# Patient Record
Sex: FEMALE | Race: WHITE
[De-identification: names, ages, dates, MRNs, and addresses within clinical notes are randomized per-mention and may not be internally consistent; named-entity substitution may affect disease eponyms.]

---

## 2019-10-17 ENCOUNTER — HOSPITAL ENCOUNTER (INPATIENT)
Dept: HOSPITAL 26 - MED | Age: 84
LOS: 3 days | Discharge: HOME | DRG: 371 | End: 2019-10-20
Attending: GENERAL PRACTICE | Admitting: GENERAL PRACTICE
Payer: COMMERCIAL

## 2019-10-17 VITALS — SYSTOLIC BLOOD PRESSURE: 124 MMHG | DIASTOLIC BLOOD PRESSURE: 75 MMHG

## 2019-10-17 VITALS — WEIGHT: 134 LBS | HEIGHT: 62 IN | BODY MASS INDEX: 24.66 KG/M2

## 2019-10-17 VITALS — SYSTOLIC BLOOD PRESSURE: 140 MMHG | DIASTOLIC BLOOD PRESSURE: 55 MMHG

## 2019-10-17 DIAGNOSIS — E86.0: ICD-10-CM

## 2019-10-17 DIAGNOSIS — E78.5: ICD-10-CM

## 2019-10-17 DIAGNOSIS — A04.72: Primary | ICD-10-CM

## 2019-10-17 DIAGNOSIS — Z83.3: ICD-10-CM

## 2019-10-17 DIAGNOSIS — R31.9: ICD-10-CM

## 2019-10-17 DIAGNOSIS — E43: ICD-10-CM

## 2019-10-17 DIAGNOSIS — Z79.82: ICD-10-CM

## 2019-10-17 DIAGNOSIS — K57.90: ICD-10-CM

## 2019-10-17 DIAGNOSIS — I25.10: ICD-10-CM

## 2019-10-17 DIAGNOSIS — E83.39: ICD-10-CM

## 2019-10-17 DIAGNOSIS — I10: ICD-10-CM

## 2019-10-17 DIAGNOSIS — J45.909: ICD-10-CM

## 2019-10-17 DIAGNOSIS — Z82.3: ICD-10-CM

## 2019-10-17 DIAGNOSIS — Z82.61: ICD-10-CM

## 2019-10-17 DIAGNOSIS — Z88.5: ICD-10-CM

## 2019-10-17 DIAGNOSIS — I48.91: ICD-10-CM

## 2019-10-17 DIAGNOSIS — Z82.49: ICD-10-CM

## 2019-10-17 DIAGNOSIS — Z79.899: ICD-10-CM

## 2019-10-17 DIAGNOSIS — A08.4: ICD-10-CM

## 2019-10-17 DIAGNOSIS — E83.42: ICD-10-CM

## 2019-10-17 LAB
ALBUMIN FLD-MCNC: 3.2 G/DL (ref 3.4–5)
AMYLASE SERPL-CCNC: 60 U/L (ref 25–115)
ANION GAP SERPL CALCULATED.3IONS-SCNC: 16 MMOL/L (ref 8–16)
APPEARANCE UR: CLEAR
AST SERPL-CCNC: 25 U/L (ref 15–37)
BASOPHILS # BLD AUTO: 0 K/UL (ref 0–0.22)
BASOPHILS NFR BLD AUTO: 0.2 % (ref 0–2)
BILIRUB SERPL-MCNC: 1 MG/DL (ref 0–1)
BILIRUB UR QL STRIP: NEGATIVE
BUN SERPL-MCNC: 19 MG/DL (ref 7–18)
CHLORIDE SERPL-SCNC: 108 MMOL/L (ref 98–107)
CHOLEST/HDLC SERPL: 2.4 {RATIO} (ref 1–4.5)
CO2 SERPL-SCNC: 24.6 MMOL/L (ref 21–32)
COLOR UR: YELLOW
CREAT SERPL-MCNC: 0.7 MG/DL (ref 0.6–1.3)
EOSINOPHIL # BLD AUTO: 0.2 K/UL (ref 0–0.4)
EOSINOPHIL NFR BLD AUTO: 1.8 % (ref 0–4)
ERYTHROCYTE [DISTWIDTH] IN BLOOD BY AUTOMATED COUNT: 13.8 % (ref 11.6–13.7)
GFR SERPL CREATININE-BSD FRML MDRD: (no result) ML/MIN (ref 90–?)
GLUCOSE SERPL-MCNC: 102 MG/DL (ref 74–106)
GLUCOSE UR STRIP-MCNC: NEGATIVE MG/DL
HCT VFR BLD AUTO: 42 % (ref 36–48)
HDLC SERPL-MCNC: 59 MG/DL (ref 40–60)
HGB BLD-MCNC: 13.9 G/DL (ref 12–16)
HGB UR QL STRIP: (no result)
LDLC SERPL CALC-MCNC: 70 MG/DL (ref 60–100)
LEUKOCYTE ESTERASE UR QL STRIP: NEGATIVE
LIPASE SERPL-CCNC: 108 U/L (ref 73–393)
LYMPHOCYTES # BLD AUTO: 0.3 K/UL (ref 2.5–16.5)
LYMPHOCYTES NFR BLD AUTO: 2.6 % (ref 20.5–51.1)
MAGNESIUM SERPL-MCNC: 1.6 MG/DL (ref 1.8–2.4)
MCH RBC QN AUTO: 31 PG (ref 27–31)
MCHC RBC AUTO-ENTMCNC: 33 G/DL (ref 33–37)
MCV RBC AUTO: 93.2 FL (ref 80–94)
MONOCYTES # BLD AUTO: 0.3 K/UL (ref 0.8–1)
MONOCYTES NFR BLD AUTO: 3.3 % (ref 1.7–9.3)
NEUTROPHILS # BLD AUTO: 9 K/UL (ref 1.8–7.7)
NEUTROPHILS NFR BLD AUTO: 92.1 % (ref 42.2–75.2)
NITRITE UR QL STRIP: NEGATIVE
PH UR STRIP: 6 [PH] (ref 5–9)
PHOSPHATE SERPL-MCNC: 2.3 MG/DL (ref 2.5–4.9)
PLATELET # BLD AUTO: 210 K/UL (ref 140–450)
POTASSIUM SERPL-SCNC: 3.6 MMOL/L (ref 3.5–5.1)
PROTHROMBIN TIME: 10.6 SECS (ref 10.8–13.4)
RBC # BLD AUTO: 4.51 MIL/UL (ref 4.2–5.4)
RBC #/AREA URNS HPF: (no result) /HPF (ref 0–5)
SODIUM SERPL-SCNC: 145 MMOL/L (ref 136–145)
T4 FREE SERPL-MCNC: 1.28 NG/DL (ref 0.76–1.46)
TRIGL SERPL-MCNC: 52 MG/DL (ref 30–150)
TSH SERPL DL<=0.05 MIU/L-ACNC: 2.12 UIU/ML (ref 0.34–3.74)
WBC # BLD AUTO: 9.8 K/UL (ref 4.8–10.8)
WBC,URINE: (no result) /HPF (ref 0–5)

## 2019-10-17 RX ADMIN — LISINOPRIL SCH MG: 10 TABLET ORAL at 22:26

## 2019-10-17 RX ADMIN — DILTIAZEM HYDROCHLORIDE SCH MG: 30 TABLET, FILM COATED ORAL at 22:26

## 2019-10-17 NOTE — NUR
PT ADMITTED TO [facility] . TRANSFERRED PT VIA LOAN LEDESMA. REPORT GIVEN TO 
YADIRA NICOLE. PT CARE TRANSFERRED TO RECEIVING RN.

## 2019-10-17 NOTE — NUR
MADE ROUNDS , NO DISTRES NOTED AT THIS TIME , NO N/V AT THIS TIME , DENIES PAIN AT THIS 
TIME, WILL CONT. TO MONITOR.

## 2019-10-17 NOTE — NUR
PATIENT PRESENTS TO ED WITH BILATERAL LOWER QUADRANTS PAIN DENIES,  SKIN IS 
PINK/WARM/DRY; AAOX4 WITH EVEN AND STEADY GAIT; LUNGS CLEAR BL; HR EVEN AND 
REGULAR; PT DENIES ANY FEVER, CP, SOB, OR COUGH AT THIS TIME; PATIENT STATES 
PAIN OF 6/10 AT THIS TIME; VSS; PATIENT POSITIONED FOR COMFORT; HOB ELEVATED; 
BEDRAILS UP X2; BED DOWN. ER MD MADE AWARE OF PT STATUS.

## 2019-10-17 NOTE — NUR
RECIEVED PT FROM ER / BALTAZAR ,  AAOX4 , NID , AMBULATES  TO BED , AMBULATE WITH  THE USE OF 
CANE  ACCORDING TO HER HE HAS FOOT DROP . C/O OF ABDL. PAIN 1/10 AS SHE RATED THE PAIN - 
JUST MEDICATED WITH TORADOL AT ER. NPO EXCEPT MEDS INSTRUCT TO PT . MRSA SPECIMEN COLLECTED 
AND SENT TO LAB . ADMISSION ASSESSMENT DONE , FALL RISK - PUT ON SAFETY / FALL PRECAUTION 
PROTOCOL - BED ALARM ON , INSTRUCT THE USE OF CALL LIGHT - CALL LIGHT WITHIN REACH. PLAN OF 
CARE DISCUSSED AND VERBALIZE UNDERSTANDING - FOR STOOL COLLECTION FOR C DIFF. WILL CONT TO 
MONITOR . WITH HX OF MRSA NARES.

-------------------------------------------------------------------------------

Addendum: 10/18/19 at 0314 by Eveline Hogan RN

-------------------------------------------------------------------------------

IV SITE INTACT AND PATENT .JOSÉ

-------------------------------------------------------------------------------

Addendum: 10/18/19 at 0317 by Eveline Hogan RN

-------------------------------------------------------------------------------

THE NURSE'S ENTRY ( HX OF STROKE ) ON ADMISSION HEALTH ASSESSMENT IS WRONG ENTRY. PT STATED 
IN THIER FAMILY THERE FAMILIAL MEDICAL HX OF STROKE  BUT SHE HAS  NO HX OF IT. TO SUM UP PT. 
HAD NO HX OF STROKE- TIKALR

## 2019-10-18 VITALS — DIASTOLIC BLOOD PRESSURE: 72 MMHG | SYSTOLIC BLOOD PRESSURE: 140 MMHG

## 2019-10-18 VITALS — DIASTOLIC BLOOD PRESSURE: 60 MMHG | SYSTOLIC BLOOD PRESSURE: 136 MMHG

## 2019-10-18 VITALS — SYSTOLIC BLOOD PRESSURE: 110 MMHG | DIASTOLIC BLOOD PRESSURE: 60 MMHG

## 2019-10-18 VITALS — DIASTOLIC BLOOD PRESSURE: 59 MMHG | SYSTOLIC BLOOD PRESSURE: 120 MMHG

## 2019-10-18 VITALS — DIASTOLIC BLOOD PRESSURE: 60 MMHG | SYSTOLIC BLOOD PRESSURE: 110 MMHG

## 2019-10-18 VITALS — DIASTOLIC BLOOD PRESSURE: 50 MMHG | SYSTOLIC BLOOD PRESSURE: 116 MMHG

## 2019-10-18 LAB
ANION GAP SERPL CALCULATED.3IONS-SCNC: 13.4 MMOL/L (ref 8–16)
BASOPHILS # BLD AUTO: 0 K/UL (ref 0–0.22)
BASOPHILS NFR BLD AUTO: 0.3 % (ref 0–2)
BUN SERPL-MCNC: 17 MG/DL (ref 7–18)
CHLORIDE SERPL-SCNC: 112 MMOL/L (ref 98–107)
CO2 SERPL-SCNC: 23.3 MMOL/L (ref 21–32)
CREAT SERPL-MCNC: 0.7 MG/DL (ref 0.6–1.3)
EOSINOPHIL # BLD AUTO: 0.1 K/UL (ref 0–0.4)
EOSINOPHIL NFR BLD AUTO: 2.9 % (ref 0–4)
ERYTHROCYTE [DISTWIDTH] IN BLOOD BY AUTOMATED COUNT: 13.9 % (ref 11.6–13.7)
GFR SERPL CREATININE-BSD FRML MDRD: (no result) ML/MIN (ref 90–?)
GLUCOSE SERPL-MCNC: 84 MG/DL (ref 74–106)
HCT VFR BLD AUTO: 37.7 % (ref 36–48)
HGB BLD-MCNC: 12.7 G/DL (ref 12–16)
LYMPHOCYTES # BLD AUTO: 0.5 K/UL (ref 2.5–16.5)
LYMPHOCYTES NFR BLD AUTO: 9.1 % (ref 20.5–51.1)
MCH RBC QN AUTO: 31 PG (ref 27–31)
MCHC RBC AUTO-ENTMCNC: 34 G/DL (ref 33–37)
MCV RBC AUTO: 92.6 FL (ref 80–94)
MONOCYTES # BLD AUTO: 0.3 K/UL (ref 0.8–1)
MONOCYTES NFR BLD AUTO: 6.7 % (ref 1.7–9.3)
NEUTROPHILS # BLD AUTO: 4.1 K/UL (ref 1.8–7.7)
NEUTROPHILS NFR BLD AUTO: 81 % (ref 42.2–75.2)
PLATELET # BLD AUTO: 187 K/UL (ref 140–450)
POTASSIUM SERPL-SCNC: 3.7 MMOL/L (ref 3.5–5.1)
RBC # BLD AUTO: 4.07 MIL/UL (ref 4.2–5.4)
SODIUM SERPL-SCNC: 145 MMOL/L (ref 136–145)
WBC # BLD AUTO: 5.1 K/UL (ref 4.8–10.8)

## 2019-10-18 RX ADMIN — Medication SCH MG: at 09:43

## 2019-10-18 RX ADMIN — DEXTROSE SCH MG: 50 INJECTION, SOLUTION INTRAVENOUS at 23:58

## 2019-10-18 RX ADMIN — DILTIAZEM HYDROCHLORIDE SCH MG: 30 TABLET, FILM COATED ORAL at 21:00

## 2019-10-18 RX ADMIN — MONTELUKAST SCH MG: 10 TABLET, FILM COATED ORAL at 09:42

## 2019-10-18 RX ADMIN — LISINOPRIL SCH MG: 10 TABLET ORAL at 21:00

## 2019-10-18 RX ADMIN — SODIUM CHLORIDE SCH MLS/HR: 9 INJECTION, SOLUTION INTRAVENOUS at 18:38

## 2019-10-18 RX ADMIN — ATORVASTATIN CALCIUM SCH MG: 20 TABLET, FILM COATED ORAL at 09:42

## 2019-10-18 RX ADMIN — LISINOPRIL SCH MG: 20 TABLET ORAL at 09:41

## 2019-10-18 RX ADMIN — DILTIAZEM HYDROCHLORIDE SCH MG: 30 TABLET, FILM COATED ORAL at 09:43

## 2019-10-18 NOTE — NUR
PATIENT HAS BEEN SCREENED AND CATEGORIZED AS MODERATE NUTRITION RISK. PATIENT WILL BE SEEN 
WITHIN 3-5 DAYS OF ADMISSION.



10/20/19  10/22/19



MARLEEN STUBBS RD

-------------------------------------------------------------------------------

Addendum: 10/18/19 at 1015 by Marleen Stubbs RD

-------------------------------------------------------------------------------

DISREGARD NOTE ABOVE. FNS CONSULT HAS BEEN RECEIVED FOR MALNUTRITION AND CARDIAC DIET 
EDUCATION. PATIENT HAS BEEN RE-SCREENED AS HIGH RISK AND WILL BE SEEN WITHIN 1-2 DAYS OF 
RECEIVING THE FNS CONSULT.



10/18/19- 10/19/19



MARLEEN STUBBS RD

## 2019-10-18 NOTE — NUR
ADMINISTERED SCHEDULED MEDICATIONS. PATIENT TOLERATED WELL. NO OTHER NEEDS AT THIS TIME. 
WILL CONTINUE TO MONITOR.

## 2019-10-18 NOTE — NUR
ADDRESSED PATIENTS CONCERNS. INSTRUCTED PATIENT TO USE CALL LIGHT IF SHE FELT NAUSEOUS. NO 
OTHER NEEDS AT THIS TIME, WILL CONTINUE TO MONITOR.

## 2019-10-18 NOTE — NUR
ADMINISTERED SCHEDULED MEDICATION. PATIENT TOLERATED WELL. PATIENT DENIES ANY PAIN. NO OTHER 
NEEDS AT THIS TIME.

## 2019-10-18 NOTE — NUR
REMOVED IV, PATIENT C/O OF PAIN AND LEAKAGE. INSERTED NEW ONE USING ASEPTIC TECHNIQUE, LEFT 
FA22G. PATIENT DENIES ANY PAIN. NO OTHER NEEDS AT THIS TIME.

## 2019-10-18 NOTE — NUR
RECIEVED PT AAOX4 , NID , IV SITE INTACT AND PATENT , WITH EPISODE OF LOOSE BM - WILL CONT. 
TO MONITOR , ON FULL LIQ. DIET - REINSTRUCTED , ON SAFETY / PRECAUTION PROTOCOL - BED ALARM 
ON , CALL LIGHT WITHIN REACH , PALAN OF CARE DISCUSSED AND VERBALIZE UNDERSTANDING , WILL 
CONT. TO MONITOR.

## 2019-10-18 NOTE — NUR
10/18/19 RD INITIAL ASSESSMENT COMPLETED



PLEASE REFER TO NUTRITION ASSESSMENT UNDER CARE ACTIVITY FOR ESTIMATED NUTRITIONAL NEEDS. 



1. CONTINUE CLEAR LIQUID DIET AS TOLERATED 

2. RECOMMEND ENSURE CLEAR TID AS TOLERATED

3. RD PROVIDED NUTRITION EDUCATION ON DIVERTICULITIS. PT ACCEPTED

4. WHEN MEDICALLY APPROPRIATE ADVANCE PT TO LOW FIBER DIET

5. RD TO FOLLOW-UP 2-3 DAYS, HIGH RISK 



HERNANDEZ STUBBS, RD

## 2019-10-18 NOTE — NUR
RECEIVED ENDORSEMENT FROM CHARGE NURSE. PATIENT IS AAOX4, ENGLISH SPEAKING. RESPIRATIONS ARE 
EVEN AND UNLABORED ON ROOM AIR. PATIENT DENIES ANY PAIN AT THIS TIME. LEFT AC 20G IV INTACT, 
PATENT, AND INFUSING IVF. PLAN OF CARE WAS REVIEWED WITH PATIENT, PATIENT VERBALIZED 
UNDERSTANDING. SAFETY MEASURES IN PLACE, CALL LIGHT WITHIN REACH.

## 2019-10-18 NOTE — NUR
MADE ROUNDS , NO ANY SIGNS OF  DISTRESS NOTED AT THIS TIME., STILL FOR STOOL COLLECTION 
.CALL LIGHT WITHIN REACH.

## 2019-10-18 NOTE — NUR
*S.T. Bedside swallow eval*

See report. Pt presents w/ adequate oropharyngeal swallow function for the current diet she 
is on, which is clear liquids. No overt s/s aspiration observed w/ P.O. trial water via cup 
sip. Pt refused all other P.O. trials due to feeling bloated, as she did yesterday, which 
led to vomiting, diarrhea and her admission into hospital. Pt was tearful and expressed her 
concerns to this clinician and RN Nichelle, who was came to room at clinician's request. 
Pt's bedside RN Shanika was with another patient at the time. Oropharyngeal dysphagia is not 
suspected. Pt denied any history of s/s aspiration. 

Recommend:

1) Continue P.O. diet as tolerated. Defer to medicine team on diet textures and dietary 
restrictions. 

2) DC to nsg care at this time. Pt does not demonstrate a clinical dysphagia and therefore 
no further tx indicated. 

Endorsed to nsg. 



Time [id number]

## 2019-10-19 VITALS — DIASTOLIC BLOOD PRESSURE: 70 MMHG | SYSTOLIC BLOOD PRESSURE: 130 MMHG

## 2019-10-19 VITALS — DIASTOLIC BLOOD PRESSURE: 63 MMHG | SYSTOLIC BLOOD PRESSURE: 124 MMHG

## 2019-10-19 VITALS — DIASTOLIC BLOOD PRESSURE: 70 MMHG | SYSTOLIC BLOOD PRESSURE: 138 MMHG

## 2019-10-19 VITALS — DIASTOLIC BLOOD PRESSURE: 72 MMHG | SYSTOLIC BLOOD PRESSURE: 133 MMHG

## 2019-10-19 VITALS — DIASTOLIC BLOOD PRESSURE: 69 MMHG | SYSTOLIC BLOOD PRESSURE: 150 MMHG

## 2019-10-19 VITALS — SYSTOLIC BLOOD PRESSURE: 143 MMHG | DIASTOLIC BLOOD PRESSURE: 66 MMHG

## 2019-10-19 LAB
ANION GAP SERPL CALCULATED.3IONS-SCNC: 12.6 MMOL/L (ref 8–16)
BASOPHILS # BLD AUTO: 0 K/UL (ref 0–0.22)
BASOPHILS NFR BLD AUTO: 0.5 % (ref 0–2)
BUN SERPL-MCNC: 9 MG/DL (ref 7–18)
CHLORIDE SERPL-SCNC: 110 MMOL/L (ref 98–107)
CO2 SERPL-SCNC: 22.3 MMOL/L (ref 21–32)
CREAT SERPL-MCNC: 0.8 MG/DL (ref 0.6–1.3)
EOSINOPHIL # BLD AUTO: 0.3 K/UL (ref 0–0.4)
EOSINOPHIL NFR BLD AUTO: 7.4 % (ref 0–4)
ERYTHROCYTE [DISTWIDTH] IN BLOOD BY AUTOMATED COUNT: 14.2 % (ref 11.6–13.7)
GFR SERPL CREATININE-BSD FRML MDRD: (no result) ML/MIN (ref 90–?)
GLUCOSE SERPL-MCNC: 79 MG/DL (ref 74–106)
HCT VFR BLD AUTO: 39.1 % (ref 36–48)
HGB BLD-MCNC: 12.9 G/DL (ref 12–16)
LYMPHOCYTES # BLD AUTO: 1.3 K/UL (ref 2.5–16.5)
LYMPHOCYTES NFR BLD AUTO: 29.3 % (ref 20.5–51.1)
MAGNESIUM SERPL-MCNC: 1.8 MG/DL (ref 1.8–2.4)
MCH RBC QN AUTO: 31 PG (ref 27–31)
MCHC RBC AUTO-ENTMCNC: 33 G/DL (ref 33–37)
MCV RBC AUTO: 94.1 FL (ref 80–94)
MONOCYTES # BLD AUTO: 0.4 K/UL (ref 0.8–1)
MONOCYTES NFR BLD AUTO: 9.8 % (ref 1.7–9.3)
NEUTROPHILS # BLD AUTO: 2.3 K/UL (ref 1.8–7.7)
NEUTROPHILS NFR BLD AUTO: 53 % (ref 42.2–75.2)
PHOSPHATE SERPL-MCNC: 2.5 MG/DL (ref 2.5–4.9)
PLATELET # BLD AUTO: 174 K/UL (ref 140–450)
POTASSIUM SERPL-SCNC: 3.9 MMOL/L (ref 3.5–5.1)
RBC # BLD AUTO: 4.16 MIL/UL (ref 4.2–5.4)
SODIUM SERPL-SCNC: 141 MMOL/L (ref 136–145)
WBC # BLD AUTO: 4.4 K/UL (ref 4.8–10.8)

## 2019-10-19 RX ADMIN — LISINOPRIL SCH MG: 20 TABLET ORAL at 09:04

## 2019-10-19 RX ADMIN — LISINOPRIL SCH MG: 10 TABLET ORAL at 21:05

## 2019-10-19 RX ADMIN — DILTIAZEM HYDROCHLORIDE SCH MG: 30 TABLET, FILM COATED ORAL at 09:04

## 2019-10-19 RX ADMIN — DEXTROSE SCH MG: 50 INJECTION, SOLUTION INTRAVENOUS at 11:54

## 2019-10-19 RX ADMIN — Medication SCH MG: at 09:04

## 2019-10-19 RX ADMIN — DEXTROSE SCH MG: 50 INJECTION, SOLUTION INTRAVENOUS at 17:46

## 2019-10-19 RX ADMIN — DEXTROSE SCH MG: 50 INJECTION, SOLUTION INTRAVENOUS at 06:00

## 2019-10-19 RX ADMIN — DILTIAZEM HYDROCHLORIDE SCH MG: 30 TABLET, FILM COATED ORAL at 21:05

## 2019-10-19 RX ADMIN — ATORVASTATIN CALCIUM SCH MG: 20 TABLET, FILM COATED ORAL at 09:05

## 2019-10-19 RX ADMIN — SODIUM CHLORIDE SCH MLS/HR: 9 INJECTION, SOLUTION INTRAVENOUS at 07:13

## 2019-10-19 RX ADMIN — DEXTROSE SCH MG: 50 INJECTION, SOLUTION INTRAVENOUS at 23:47

## 2019-10-19 RX ADMIN — MONTELUKAST SCH MG: 10 TABLET, FILM COATED ORAL at 09:04

## 2019-10-19 NOTE — NUR
Received bedside report from pm nurse Eveline. Pt resting in bed, awake, watching TV, no 
signs of distress, no c/o discomfort. Left forearm IV intact with ongoing NS@ 80ml/h. Pt's 
personal cane & call light within reach. Encouraged pt to call for assistance with 
transfers/ambulation for safety, verbalized understanding.

## 2019-10-19 NOTE — NUR
Pt sitting up in bed, eating dinner, no c/o discomfort, no signs of distress. Pt denies any 
GI discomfort. Left forearm IV intact & asymptomatic. Call light within reach.

## 2019-10-19 NOTE — NUR
PATIENT IN BED WATCHING TV RESPIRATION EVEN UNLABORED ON ROOM AIR. NO DISTRESS NOTED. WILL 
CONTINUE TO MONITOR.

## 2019-10-19 NOTE — NUR
RECEIVED BEDSIDE REPORT FROM DAY SHIFT NURSE. PATIENT IS AWAKE, ALERT, AND COOPERATIVE. 
RESPIRATION EVEN UNLABORED ON ROOM AIR. NO DISTRESS NOTED. SKIN IS WARM AND DRY. IV PATENT 
AND INTACT. DENIES PAIN. FAMILY AT BEDSIDE. PLAN OF CARE WAS DISCUSSED. ALL SAFETY MEASURES 
IN PLACE. CONTACT PRECAUTION MAINTAINED. BED IS AT LOW POSITION. CALL LIGHT WITHIN REACH AND 
VERBALIZES ITS USE. WILL CONTINUE TO MONITOR.

## 2019-10-19 NOTE — NUR
Pt resting in bed, watching TV, no signs of distress, no c/o pain. Per pt, no BM since last 
night, no GI discomfort. Left forearm IV intact with ongoing NS @ 80ml/hr. Call light within 
reach.

## 2019-10-20 VITALS — DIASTOLIC BLOOD PRESSURE: 70 MMHG | SYSTOLIC BLOOD PRESSURE: 145 MMHG

## 2019-10-20 VITALS — SYSTOLIC BLOOD PRESSURE: 142 MMHG | DIASTOLIC BLOOD PRESSURE: 75 MMHG

## 2019-10-20 VITALS — SYSTOLIC BLOOD PRESSURE: 150 MMHG | DIASTOLIC BLOOD PRESSURE: 66 MMHG

## 2019-10-20 LAB
ANION GAP SERPL CALCULATED.3IONS-SCNC: 13.1 MMOL/L (ref 8–16)
BASOPHILS # BLD AUTO: 0 K/UL (ref 0–0.22)
BASOPHILS NFR BLD AUTO: 0.5 % (ref 0–2)
BUN SERPL-MCNC: 8 MG/DL (ref 7–18)
CHLORIDE SERPL-SCNC: 110 MMOL/L (ref 98–107)
CO2 SERPL-SCNC: 24.7 MMOL/L (ref 21–32)
CREAT SERPL-MCNC: 0.7 MG/DL (ref 0.6–1.3)
EOSINOPHIL # BLD AUTO: 0.4 K/UL (ref 0–0.4)
EOSINOPHIL NFR BLD AUTO: 8.5 % (ref 0–4)
ERYTHROCYTE [DISTWIDTH] IN BLOOD BY AUTOMATED COUNT: 13.8 % (ref 11.6–13.7)
GFR SERPL CREATININE-BSD FRML MDRD: (no result) ML/MIN (ref 90–?)
GLUCOSE SERPL-MCNC: 81 MG/DL (ref 74–106)
HCT VFR BLD AUTO: 39.1 % (ref 36–48)
HGB BLD-MCNC: 13 G/DL (ref 12–16)
LYMPHOCYTES # BLD AUTO: 1.4 K/UL (ref 2.5–16.5)
LYMPHOCYTES NFR BLD AUTO: 30.3 % (ref 20.5–51.1)
MAGNESIUM SERPL-MCNC: 1.7 MG/DL (ref 1.8–2.4)
MCH RBC QN AUTO: 31 PG (ref 27–31)
MCHC RBC AUTO-ENTMCNC: 33 G/DL (ref 33–37)
MCV RBC AUTO: 92.6 FL (ref 80–94)
MONOCYTES # BLD AUTO: 0.5 K/UL (ref 0.8–1)
MONOCYTES NFR BLD AUTO: 10.3 % (ref 1.7–9.3)
NEUTROPHILS # BLD AUTO: 2.3 K/UL (ref 1.8–7.7)
NEUTROPHILS NFR BLD AUTO: 50.4 % (ref 42.2–75.2)
PHOSPHATE SERPL-MCNC: 3.2 MG/DL (ref 2.5–4.9)
PLATELET # BLD AUTO: 166 K/UL (ref 140–450)
POTASSIUM SERPL-SCNC: 3.8 MMOL/L (ref 3.5–5.1)
RBC # BLD AUTO: 4.22 MIL/UL (ref 4.2–5.4)
SODIUM SERPL-SCNC: 144 MMOL/L (ref 136–145)
WBC # BLD AUTO: 4.5 K/UL (ref 4.8–10.8)

## 2019-10-20 RX ADMIN — MONTELUKAST SCH MG: 10 TABLET, FILM COATED ORAL at 08:34

## 2019-10-20 RX ADMIN — DEXTROSE SCH MG: 50 INJECTION, SOLUTION INTRAVENOUS at 06:01

## 2019-10-20 RX ADMIN — ATORVASTATIN CALCIUM SCH MG: 20 TABLET, FILM COATED ORAL at 08:33

## 2019-10-20 RX ADMIN — Medication SCH MG: at 08:33

## 2019-10-20 RX ADMIN — LISINOPRIL SCH MG: 20 TABLET ORAL at 08:33

## 2019-10-20 RX ADMIN — DILTIAZEM HYDROCHLORIDE SCH MG: 30 TABLET, FILM COATED ORAL at 08:35

## 2019-10-20 NOTE — NUR
Pt discharge to home. Pt was taken by wheel chair to her car. Pt was accompanied by son and 
grandson. Pt was stable at discharge. no C/O pain. No distress noted. Discharge instruction 
given to patient. All belongings with patient.  Pt to follow up PCP within 5 days. IV line 
removed. Catheter intact.

## 2022-02-26 ENCOUNTER — HOSPITAL ENCOUNTER (EMERGENCY)
Dept: HOSPITAL 26 - MED | Age: 87
Discharge: HOME | End: 2022-02-26
Payer: COMMERCIAL

## 2022-02-26 VITALS — SYSTOLIC BLOOD PRESSURE: 160 MMHG | DIASTOLIC BLOOD PRESSURE: 74 MMHG

## 2022-02-26 VITALS — DIASTOLIC BLOOD PRESSURE: 78 MMHG | SYSTOLIC BLOOD PRESSURE: 128 MMHG

## 2022-02-26 VITALS — WEIGHT: 170 LBS | HEIGHT: 64 IN | BODY MASS INDEX: 29.02 KG/M2

## 2022-02-26 DIAGNOSIS — Z79.899: ICD-10-CM

## 2022-02-26 DIAGNOSIS — M25.775: Primary | ICD-10-CM

## 2022-02-26 DIAGNOSIS — I10: ICD-10-CM

## 2022-02-26 DIAGNOSIS — Z79.82: ICD-10-CM

## 2022-02-26 DIAGNOSIS — J45.909: ICD-10-CM

## 2022-02-26 DIAGNOSIS — Z88.5: ICD-10-CM

## 2022-02-26 PROCEDURE — 99284 EMERGENCY DEPT VISIT MOD MDM: CPT

## 2022-02-26 PROCEDURE — 93971 EXTREMITY STUDY: CPT

## 2022-02-26 NOTE — NUR
Patient discharged with v/s stable. Written and verbal after care instructions 
given on corn and calluses and explained. 

Patient alert, oriented and verbalized understanding of instructions. 
Ambulatory with steady gait. All questions addressed prior to discharge. ID 
band removed. Patient advised to follow up with PMD. Rx of keflex given. 
Opportunity to ask questions provided and answered.

## 2022-02-26 NOTE — NUR
86/F C/O OF RIGHT FOOT CALLUS, 7/10 PAIN. PATIENT STATES THAT IT HURTS TO WALK 
ON FOOT AND IT FEELS LIKE ITS INFECTED. 



PMHX: HYPERTENSION, A-FIB, CHOLESTEROL, ADULT ASTHMA

## 2022-03-12 ENCOUNTER — HOSPITAL ENCOUNTER (EMERGENCY)
Dept: HOSPITAL 26 - MED | Age: 87
Discharge: HOME | End: 2022-03-12
Payer: COMMERCIAL

## 2022-03-12 VITALS — DIASTOLIC BLOOD PRESSURE: 72 MMHG | SYSTOLIC BLOOD PRESSURE: 152 MMHG

## 2022-03-12 VITALS — SYSTOLIC BLOOD PRESSURE: 152 MMHG | DIASTOLIC BLOOD PRESSURE: 72 MMHG

## 2022-03-12 VITALS — HEIGHT: 63 IN | BODY MASS INDEX: 29.23 KG/M2 | WEIGHT: 165 LBS

## 2022-03-12 DIAGNOSIS — Z79.82: ICD-10-CM

## 2022-03-12 DIAGNOSIS — J45.909: ICD-10-CM

## 2022-03-12 DIAGNOSIS — Z85.9: ICD-10-CM

## 2022-03-12 DIAGNOSIS — Z88.5: ICD-10-CM

## 2022-03-12 DIAGNOSIS — M25.512: Primary | ICD-10-CM

## 2022-03-12 DIAGNOSIS — Z79.899: ICD-10-CM

## 2022-03-12 DIAGNOSIS — I10: ICD-10-CM

## 2022-03-12 PROCEDURE — 99284 EMERGENCY DEPT VISIT MOD MDM: CPT

## 2022-03-12 PROCEDURE — 73030 X-RAY EXAM OF SHOULDER: CPT

## 2022-03-12 PROCEDURE — 71045 X-RAY EXAM CHEST 1 VIEW: CPT

## 2022-03-12 NOTE — NUR
Patient discharged with v/s stable. Written and verbal after care instructions 
given and explained. 

Patient alert, oriented and verbalized understanding of instructions. 
Ambulatory with steady gait. All questions addressed prior to discharge. ID 
band removed. IV DISCONTINUED. Patient advised to follow up with PMD. Rx of 
LIDOCAINE given. Patient educated on indication of medication including 
possible reaction and side effects. Opportunity to ask questions provided and 
answered.

## 2022-03-12 NOTE — NUR
85 y/o F BIBA from home c/o L upper back pain since yesterday, reports today 
worsening pain to L shoulder and L side of neck. Patient A&Ox4, ambulatory with 
cane, states pain yesterday was alleviated with repositioning and reports pain 
radiating pain. Pt noted with infection to L foot per EMS. Patient reports 
8/10, dull/constant, radiating to shoulder, L neck, and chest. EMS 12 lead 
A-fib @ HR 88. Pt denies nausea, vomiting, SOB, fall, trauma, dizziness, 
headache. Pt placed into gown and cardiac monitor. Bed locked in lowest 
position, side rails x 1. 20G established to Left FA by EMS. 



PMH: a-fib, HTN, HLD, asthma 

Meds: simacor, keflex, ASA,  montelukast, lisinopril

A: morphine

## 2022-06-26 ENCOUNTER — HOSPITAL ENCOUNTER (EMERGENCY)
Dept: HOSPITAL 26 - MED | Age: 87
Discharge: HOME | End: 2022-06-26
Payer: COMMERCIAL

## 2022-06-26 VITALS — HEIGHT: 62 IN | WEIGHT: 167.25 LBS | BODY MASS INDEX: 30.78 KG/M2

## 2022-06-26 VITALS — DIASTOLIC BLOOD PRESSURE: 89 MMHG | SYSTOLIC BLOOD PRESSURE: 126 MMHG

## 2022-06-26 VITALS — DIASTOLIC BLOOD PRESSURE: 57 MMHG | SYSTOLIC BLOOD PRESSURE: 113 MMHG

## 2022-06-26 DIAGNOSIS — I48.20: ICD-10-CM

## 2022-06-26 DIAGNOSIS — Z79.2: ICD-10-CM

## 2022-06-26 DIAGNOSIS — Z79.82: ICD-10-CM

## 2022-06-26 DIAGNOSIS — Z88.5: ICD-10-CM

## 2022-06-26 DIAGNOSIS — J45.909: ICD-10-CM

## 2022-06-26 DIAGNOSIS — R19.7: ICD-10-CM

## 2022-06-26 DIAGNOSIS — Z20.822: ICD-10-CM

## 2022-06-26 DIAGNOSIS — J01.90: Primary | ICD-10-CM

## 2022-06-26 DIAGNOSIS — I10: ICD-10-CM

## 2022-06-26 DIAGNOSIS — Z79.899: ICD-10-CM

## 2022-06-26 DIAGNOSIS — Z98.890: ICD-10-CM

## 2022-06-26 LAB
ANION GAP SERPL CALCULATED.3IONS-SCNC: 14.7 MMOL/L (ref 8–16)
BASOPHILS # BLD AUTO: 0 K/UL (ref 0–0.22)
BASOPHILS NFR BLD AUTO: 0.3 % (ref 0–2)
BUN SERPL-MCNC: 12 MG/DL (ref 7–18)
CHLORIDE SERPL-SCNC: 109 MMOL/L (ref 98–107)
CO2 SERPL-SCNC: 24.3 MMOL/L (ref 21–32)
CREAT SERPL-MCNC: 0.9 MG/DL (ref 0.6–1.3)
EOSINOPHIL # BLD AUTO: 1.9 K/UL (ref 0–0.4)
EOSINOPHIL NFR BLD AUTO: 22.4 % (ref 0–4)
ERYTHROCYTE [DISTWIDTH] IN BLOOD BY AUTOMATED COUNT: 14.6 % (ref 11.6–13.7)
GFR SERPL CREATININE-BSD FRML MDRD: (no result) ML/MIN (ref 90–?)
GLUCOSE SERPL-MCNC: 109 MG/DL (ref 74–106)
HCT VFR BLD AUTO: 40.2 % (ref 36–48)
HGB BLD-MCNC: 13.7 G/DL (ref 12–16)
LYMPHOCYTES # BLD AUTO: 1.3 K/UL (ref 2.5–16.5)
LYMPHOCYTES NFR BLD AUTO: 14.9 % (ref 20.5–51.1)
MCH RBC QN AUTO: 32 PG (ref 27–31)
MCHC RBC AUTO-ENTMCNC: 34 G/DL (ref 33–37)
MCV RBC AUTO: 92.9 FL (ref 80–94)
MONOCYTES # BLD AUTO: 0.8 K/UL (ref 0.8–1)
MONOCYTES NFR BLD AUTO: 9.3 % (ref 1.7–9.3)
NEUTROPHILS # BLD AUTO: 4.5 K/UL (ref 1.8–7.7)
NEUTROPHILS NFR BLD AUTO: 53.1 % (ref 42.2–75.2)
PLATELET # BLD AUTO: 203 K/UL (ref 140–450)
POTASSIUM SERPL-SCNC: 4 MMOL/L (ref 3.5–5.1)
RBC # BLD AUTO: 4.33 MIL/UL (ref 4.2–5.4)
SODIUM SERPL-SCNC: 144 MMOL/L (ref 136–145)
WBC # BLD AUTO: 8.5 K/UL (ref 4.8–10.8)

## 2022-06-26 PROCEDURE — 99285 EMERGENCY DEPT VISIT HI MDM: CPT

## 2022-06-26 PROCEDURE — 85025 COMPLETE CBC W/AUTO DIFF WBC: CPT

## 2022-06-26 PROCEDURE — 93005 ELECTROCARDIOGRAM TRACING: CPT

## 2022-06-26 PROCEDURE — 71045 X-RAY EXAM CHEST 1 VIEW: CPT

## 2022-06-26 PROCEDURE — 81002 URINALYSIS NONAUTO W/O SCOPE: CPT

## 2022-06-26 PROCEDURE — 96361 HYDRATE IV INFUSION ADD-ON: CPT

## 2022-06-26 PROCEDURE — 96360 HYDRATION IV INFUSION INIT: CPT

## 2022-06-26 PROCEDURE — 83735 ASSAY OF MAGNESIUM: CPT

## 2022-06-26 PROCEDURE — 80048 BASIC METABOLIC PNL TOTAL CA: CPT

## 2022-06-26 PROCEDURE — 83880 ASSAY OF NATRIURETIC PEPTIDE: CPT

## 2022-06-26 PROCEDURE — 84484 ASSAY OF TROPONIN QUANT: CPT

## 2022-06-26 PROCEDURE — 36415 COLL VENOUS BLD VENIPUNCTURE: CPT

## 2022-06-26 PROCEDURE — 87426 SARSCOV CORONAVIRUS AG IA: CPT

## 2022-06-26 PROCEDURE — 87804 INFLUENZA ASSAY W/OPTIC: CPT

## 2022-06-26 NOTE — NUR
Patient discharged with v/s stable. Written and verbal after care instructions 
given and explained. 

Patient alert, oriented and verbalized understanding of instructions. 
Ambulatory to car. All questions addressed prior to discharge. ID band removed. 
Patient advised to follow up with PMD. Rx of ditiazem (sent) given. Patient 
educated on indication of medication including possible reaction and side 
effects. Opportunity to ask questions provided and answered.

## 2022-06-26 NOTE — NUR
88 y/o female, c/o throat pain, cough, chest/nasal congestion, ha for 1 month. 
pt reports new onset of diarrhea this morning. pt also states she tested 
negative for covid this morning with at home test. skin is pink/warm/dry. a&o 
x4 with even and steady gait. lungs clear bl, heart rate even and regular. pt 
denies anyone sick in the household with the same symptoms. pt states pain is 
8/10 at this time. patient positioned for comfort. hob elevated. bed down. ermd 
made aware of pt.



pmh: atrial fibrillation, htn, asthma

allergy: morphine 

med: denies

## 2023-01-23 ENCOUNTER — HOSPITAL ENCOUNTER (INPATIENT)
Dept: HOSPITAL 26 - MED | Age: 88
LOS: 2 days | Discharge: HOME HEALTH | DRG: 299 | End: 2023-01-25
Attending: STUDENT IN AN ORGANIZED HEALTH CARE EDUCATION/TRAINING PROGRAM | Admitting: STUDENT IN AN ORGANIZED HEALTH CARE EDUCATION/TRAINING PROGRAM
Payer: COMMERCIAL

## 2023-01-23 VITALS — WEIGHT: 99.99 LBS | HEIGHT: 62 IN | BODY MASS INDEX: 18.4 KG/M2

## 2023-01-23 VITALS — SYSTOLIC BLOOD PRESSURE: 143 MMHG | DIASTOLIC BLOOD PRESSURE: 107 MMHG

## 2023-01-23 DIAGNOSIS — Z82.3: ICD-10-CM

## 2023-01-23 DIAGNOSIS — I82.432: ICD-10-CM

## 2023-01-23 DIAGNOSIS — E44.1: ICD-10-CM

## 2023-01-23 DIAGNOSIS — N17.0: ICD-10-CM

## 2023-01-23 DIAGNOSIS — J45.909: ICD-10-CM

## 2023-01-23 DIAGNOSIS — E78.5: ICD-10-CM

## 2023-01-23 DIAGNOSIS — Z20.822: ICD-10-CM

## 2023-01-23 DIAGNOSIS — I10: ICD-10-CM

## 2023-01-23 DIAGNOSIS — I45.10: ICD-10-CM

## 2023-01-23 DIAGNOSIS — Z88.5: ICD-10-CM

## 2023-01-23 DIAGNOSIS — Z79.82: ICD-10-CM

## 2023-01-23 DIAGNOSIS — R65.10: ICD-10-CM

## 2023-01-23 DIAGNOSIS — I82.412: Primary | ICD-10-CM

## 2023-01-23 DIAGNOSIS — Z82.49: ICD-10-CM

## 2023-01-23 DIAGNOSIS — Z83.3: ICD-10-CM

## 2023-01-23 DIAGNOSIS — Z79.899: ICD-10-CM

## 2023-01-23 DIAGNOSIS — I26.99: ICD-10-CM

## 2023-01-23 LAB
ALBUMIN FLD-MCNC: 3.3 G/DL (ref 3.4–5)
ANION GAP SERPL CALCULATED.3IONS-SCNC: 13.2 MMOL/L (ref 8–16)
AST SERPL-CCNC: 14 U/L (ref 15–37)
BASOPHILS # BLD AUTO: 0.1 K/UL (ref 0–0.22)
BASOPHILS NFR BLD AUTO: 0.6 % (ref 0–2)
BILIRUB SERPL-MCNC: 0.9 MG/DL (ref 0–1)
BUN SERPL-MCNC: 30 MG/DL (ref 7–18)
CHLORIDE SERPL-SCNC: 108 MMOL/L (ref 98–107)
CO2 SERPL-SCNC: 23.1 MMOL/L (ref 21–32)
CREAT SERPL-MCNC: 1.1 MG/DL (ref 0.6–1.3)
EOSINOPHIL # BLD AUTO: 0.1 K/UL (ref 0–0.4)
EOSINOPHIL NFR BLD AUTO: 1.3 % (ref 0–4)
ERYTHROCYTE [DISTWIDTH] IN BLOOD BY AUTOMATED COUNT: 15.2 % (ref 11.6–13.7)
GFR SERPL CREATININE-BSD FRML MDRD: (no result) ML/MIN (ref 90–?)
GLUCOSE SERPL-MCNC: 102 MG/DL (ref 74–106)
HCT VFR BLD AUTO: 42 % (ref 36–48)
HGB BLD-MCNC: 14 G/DL (ref 12–16)
LYMPHOCYTES # BLD AUTO: 2 K/UL (ref 2.5–16.5)
LYMPHOCYTES NFR BLD AUTO: 24 % (ref 20.5–51.1)
MCH RBC QN AUTO: 31 PG (ref 27–31)
MCHC RBC AUTO-ENTMCNC: 33 G/DL (ref 33–37)
MCV RBC AUTO: 91.5 FL (ref 80–94)
MONOCYTES # BLD AUTO: 0.8 K/UL (ref 0.8–1)
MONOCYTES NFR BLD AUTO: 9.9 % (ref 1.7–9.3)
NEUTROPHILS # BLD AUTO: 5.5 K/UL (ref 1.8–7.7)
NEUTROPHILS NFR BLD AUTO: 64.2 % (ref 42.2–75.2)
PLATELET # BLD AUTO: 194 K/UL (ref 140–450)
POTASSIUM SERPL-SCNC: 4.3 MMOL/L (ref 3.5–5.1)
RBC # BLD AUTO: 4.59 MIL/UL (ref 4.2–5.4)
SODIUM SERPL-SCNC: 140 MMOL/L (ref 136–145)
WBC # BLD AUTO: 8.5 K/UL (ref 4.8–10.8)

## 2023-01-23 RX ADMIN — ENOXAPARIN SODIUM SCH MG: 100 INJECTION SUBCUTANEOUS at 21:24

## 2023-01-23 RX ADMIN — ASPIRIN TAB DELAYED RELEASE 81 MG SCH MG: 81 TABLET DELAYED RESPONSE at 21:20

## 2023-01-23 NOTE — NUR
PT IS AWAKE AND ALERT. RESTING AND  ABLE TO ANSWER QUESTIONS. SHE SAID SHE IS 
USING CANE AND CANT WALK VERY FAR SINCE T CAUSES HER OUT OF BREATH

## 2023-01-23 NOTE — NUR
Patient will be admitted to care of [facility] . [facility] to [facility] [place]  Will go to 
room 119A. Belongings list completed.  Report to ELDER

## 2023-01-24 VITALS — SYSTOLIC BLOOD PRESSURE: 155 MMHG | DIASTOLIC BLOOD PRESSURE: 79 MMHG

## 2023-01-24 VITALS — SYSTOLIC BLOOD PRESSURE: 127 MMHG | DIASTOLIC BLOOD PRESSURE: 68 MMHG

## 2023-01-24 VITALS — DIASTOLIC BLOOD PRESSURE: 74 MMHG | SYSTOLIC BLOOD PRESSURE: 146 MMHG

## 2023-01-24 VITALS — DIASTOLIC BLOOD PRESSURE: 49 MMHG | SYSTOLIC BLOOD PRESSURE: 140 MMHG

## 2023-01-24 LAB
ANION GAP SERPL CALCULATED.3IONS-SCNC: 12.6 MMOL/L (ref 8–16)
BASOPHILS # BLD AUTO: 0.1 K/UL (ref 0–0.22)
BASOPHILS NFR BLD AUTO: 0.9 % (ref 0–2)
BUN SERPL-MCNC: 23 MG/DL (ref 7–18)
CHLORIDE SERPL-SCNC: 110 MMOL/L (ref 98–107)
CO2 SERPL-SCNC: 25.2 MMOL/L (ref 21–32)
CREAT SERPL-MCNC: 1 MG/DL (ref 0.6–1.3)
EOSINOPHIL # BLD AUTO: 0.2 K/UL (ref 0–0.4)
EOSINOPHIL NFR BLD AUTO: 3 % (ref 0–4)
ERYTHROCYTE [DISTWIDTH] IN BLOOD BY AUTOMATED COUNT: 15.2 % (ref 11.6–13.7)
GFR SERPL CREATININE-BSD FRML MDRD: (no result) ML/MIN (ref 90–?)
GLUCOSE SERPL-MCNC: 86 MG/DL (ref 74–106)
HCT VFR BLD AUTO: 39.3 % (ref 36–48)
HGB BLD-MCNC: 13.1 G/DL (ref 12–16)
LYMPHOCYTES # BLD AUTO: 2.7 K/UL (ref 2.5–16.5)
LYMPHOCYTES NFR BLD AUTO: 34.3 % (ref 20.5–51.1)
MCH RBC QN AUTO: 31 PG (ref 27–31)
MCHC RBC AUTO-ENTMCNC: 33 G/DL (ref 33–37)
MCV RBC AUTO: 91.7 FL (ref 80–94)
MONOCYTES # BLD AUTO: 0.7 K/UL (ref 0.8–1)
MONOCYTES NFR BLD AUTO: 8.9 % (ref 1.7–9.3)
NEUTROPHILS # BLD AUTO: 4.2 K/UL (ref 1.8–7.7)
NEUTROPHILS NFR BLD AUTO: 52.9 % (ref 42.2–75.2)
PLATELET # BLD AUTO: 179 K/UL (ref 140–450)
POTASSIUM SERPL-SCNC: 3.8 MMOL/L (ref 3.5–5.1)
RBC # BLD AUTO: 4.28 MIL/UL (ref 4.2–5.4)
SODIUM SERPL-SCNC: 144 MMOL/L (ref 136–145)
WBC # BLD AUTO: 7.9 K/UL (ref 4.8–10.8)

## 2023-01-24 RX ADMIN — MONTELUKAST SCH MG: 10 TABLET, FILM COATED ORAL at 09:31

## 2023-01-24 RX ADMIN — DILTIAZEM HYDROCHLORIDE SCH MG: 30 TABLET, FILM COATED ORAL at 09:00

## 2023-01-24 RX ADMIN — ASPIRIN TAB DELAYED RELEASE 81 MG SCH MG: 81 TABLET DELAYED RESPONSE at 20:07

## 2023-01-24 RX ADMIN — ATORVASTATIN CALCIUM SCH MG: 20 TABLET, FILM COATED ORAL at 09:31

## 2023-01-24 RX ADMIN — DILTIAZEM HYDROCHLORIDE SCH MG: 30 TABLET, FILM COATED ORAL at 17:00

## 2023-01-24 RX ADMIN — DILTIAZEM HYDROCHLORIDE SCH MG: 30 TABLET, FILM COATED ORAL at 12:33

## 2023-01-24 RX ADMIN — ENOXAPARIN SODIUM SCH MG: 100 INJECTION SUBCUTANEOUS at 20:09

## 2023-01-24 RX ADMIN — Medication SCH MG: at 09:00

## 2023-01-24 NOTE — NUR
CAME BACK FROM [facility] VIA [facility] WITH [facility] MEDICAL TRANSPORT. IN STABLE CONDITION. KEEP 
COMFORTABLE ON BED.

## 2023-01-24 NOTE — NUR
ASSUMED CONTINUITY OF CARE. INITIAL ASSESSMENT DONE. SKIN BRUISE ON BUE NOTED. KEEP 
COMFORTABLE ON BED. EXPLAINED DIAGNOSIS, PLAN OF CARE, PAIN MANAGEMENT TEACHING, USE OF CALL 
LIGHT, BED, TV, BATHROOM. VERBALIZED UNDERSTANDING. FALL PRECAUTION APPLIED. CALL LIGHT 
WITHIN REACH.

## 2023-01-24 NOTE — NUR
PAGED DR. LIANG AND INFORMED THAT CT IS DOWN PER CT DEPARTMENT STAFF. INFORMED CHARGE 
NURSE ALEX HALL.

## 2023-01-24 NOTE — NUR
WENT TO [facility] VIA [place] WITH [facility] MEDICAL TRANSPORT FOR CT CHEST ANGIO PROCEDURE. AWAKE, 
ALERT, AND ORIENTED X4. NO C/O PAIN. NO SOB,NOTED. IN STABLE CONDITION. INFORMED CHARGE 
NURSE MONO HALL.

## 2023-01-24 NOTE — NUR
PAGED DR. LIANG REGARDING HOLDING OF BP MEDS CARDIZEM, LISINOPRIL, AND ISORDIL SCHED AT 
0900 DUE TO LOW HEART RATE OF 50. INFORMED CHARGE NURSE -ALEX HALL.

## 2023-01-24 NOTE — NUR
DC PLANNING:

CALLED [facility] RADIOLOGY SPOKE WITH ZOEY  FAXED  ALL THE PAPERWORK ARRANGED 
TRANSPORT WITH [facility]  PICK UPTIME 4:30PM ARRANGED WAIT AND RETURN. PATIENT WILL BE BACK TO 
[facility] AS SOON AS SHE IS DONE WITH CT ANGIO CHEST. NOTIFIED WALE RICH CM TO FOLLOW

## 2023-01-24 NOTE — NUR
[facility] MEDICAL TRANSPORT CAME FOR PT. TRANSPORTATION TO [facility]. BEDSIDE REPORT GIVEN TO 
TRANSPORT PERSONNEL.

## 2023-01-24 NOTE — NUR
RECEIVED PATIENT IN BED AWAKE, ALERT AND ORIENTED X 4. FAMILY MEMBER AT THE BEDSIDE. DENIES 
PAIN AT THIS TIME. DENIES SHORTNESS OF BREATH ON ROOM AIR SAT AT 97%. SKIN WARM AND DRY TO 
TOUCH. SAFETY PRECAUTIONS IN [place], CALL LIGHT IN REACH, ENCOURAGED TO CALL IF ASSISTANCE IS 
NEEDED, PT VERBALLY ACKNOWLEDGED.

## 2023-01-24 NOTE — NUR
REPORT GIVEN TO BRIANA HALL. IN STABLE CONDITION. ALSO ENDORSED ABOUT CRITICAL RESULTS OF CT 
ANGIO CHEST, INFORMING DR. LIANG AND DR. MIGEUL, NO OTHER ORDER RECEIVED EXCEPT PULMONOLOGY 
CONSULT WITH DR. MIGUEL.

## 2023-01-24 NOTE — NUR
CALLED DR. MIGUEL AND INFORMED OF DR. LIANG PULMONOLOGY CONSULT ORDER REGARDING CRITICAL 
RESULTS OF CT ANGIO CHEST. IMPRESSION WAS READ VIA PHONE TO DR. MIGUEL. ALSO INFORMED DR. MIGUEL THAT PT. ALREADY ON LOVENOX 50 MG SUB-Q QHS.. ACCORDING TO DR. MIGUEL, HE WILL SEE PT. 
IN THE MORNING.

## 2023-01-24 NOTE — NUR
PATIENT COMPLAINING OF THROAT PAIN, TYLENOL GIVEN AS ORDERED. PROVIDED WARM BLANKET. CALL 
LIGHT REMAINS WITHIN REACH.

## 2023-01-24 NOTE — NUR
CALLED DR. LIANG AND INFORMED OF CRITICAL RESULTS OF CT ANGIO CHEST. IMPRESSION WAS READ 
VIA PHONE TO DR. LIANG. PULMONOLOGY CONSULT WITH DR. MIGUEL RECEIVED, READ BACK AND 
VERIFIED. NO FURTHER ORDER RECEIVED.

## 2023-01-25 VITALS — SYSTOLIC BLOOD PRESSURE: 138 MMHG | DIASTOLIC BLOOD PRESSURE: 71 MMHG

## 2023-01-25 VITALS — DIASTOLIC BLOOD PRESSURE: 60 MMHG | SYSTOLIC BLOOD PRESSURE: 137 MMHG

## 2023-01-25 VITALS — DIASTOLIC BLOOD PRESSURE: 58 MMHG | SYSTOLIC BLOOD PRESSURE: 113 MMHG

## 2023-01-25 VITALS — SYSTOLIC BLOOD PRESSURE: 137 MMHG | DIASTOLIC BLOOD PRESSURE: 60 MMHG

## 2023-01-25 LAB
ANION GAP SERPL CALCULATED.3IONS-SCNC: 10.6 MMOL/L (ref 8–16)
BASOPHILS # BLD AUTO: 0.1 K/UL (ref 0–0.22)
BASOPHILS NFR BLD AUTO: 0.8 % (ref 0–2)
BUN SERPL-MCNC: 26 MG/DL (ref 7–18)
CHLORIDE SERPL-SCNC: 108 MMOL/L (ref 98–107)
CO2 SERPL-SCNC: 26.5 MMOL/L (ref 21–32)
CREAT SERPL-MCNC: 0.9 MG/DL (ref 0.6–1.3)
EOSINOPHIL # BLD AUTO: 0.3 K/UL (ref 0–0.4)
EOSINOPHIL NFR BLD AUTO: 3.6 % (ref 0–4)
ERYTHROCYTE [DISTWIDTH] IN BLOOD BY AUTOMATED COUNT: 15.3 % (ref 11.6–13.7)
GFR SERPL CREATININE-BSD FRML MDRD: (no result) ML/MIN (ref 90–?)
GLUCOSE SERPL-MCNC: 86 MG/DL (ref 74–106)
HCT VFR BLD AUTO: 41 % (ref 36–48)
HGB BLD-MCNC: 13.6 G/DL (ref 12–16)
LYMPHOCYTES # BLD AUTO: 2.8 K/UL (ref 2.5–16.5)
LYMPHOCYTES NFR BLD AUTO: 37.1 % (ref 20.5–51.1)
MCH RBC QN AUTO: 30 PG (ref 27–31)
MCHC RBC AUTO-ENTMCNC: 33 G/DL (ref 33–37)
MCV RBC AUTO: 91 FL (ref 80–94)
MONOCYTES # BLD AUTO: 0.8 K/UL (ref 0.8–1)
MONOCYTES NFR BLD AUTO: 10.3 % (ref 1.7–9.3)
NEUTROPHILS # BLD AUTO: 3.7 K/UL (ref 1.8–7.7)
NEUTROPHILS NFR BLD AUTO: 48.2 % (ref 42.2–75.2)
PLATELET # BLD AUTO: 208 K/UL (ref 140–450)
POTASSIUM SERPL-SCNC: 4.1 MMOL/L (ref 3.5–5.1)
RBC # BLD AUTO: 4.5 MIL/UL (ref 4.2–5.4)
SODIUM SERPL-SCNC: 141 MMOL/L (ref 136–145)
WBC # BLD AUTO: 7.7 K/UL (ref 4.8–10.8)

## 2023-01-25 RX ADMIN — ATORVASTATIN CALCIUM SCH MG: 20 TABLET, FILM COATED ORAL at 08:38

## 2023-01-25 RX ADMIN — DILTIAZEM HYDROCHLORIDE SCH MG: 30 TABLET, FILM COATED ORAL at 08:39

## 2023-01-25 RX ADMIN — MONTELUKAST SCH MG: 10 TABLET, FILM COATED ORAL at 08:38

## 2023-01-25 RX ADMIN — Medication SCH MG: at 08:39

## 2023-01-25 NOTE — NUR
PATIENT IS ASLEEP. NO DISTRESS NOTED. ALL NEEDS ATTENDED TO. SAFETY PRECAUTIONS MAINTAINED 
DURING THE SHIFT, CALL LIGHT REMAINS WITHIN REACH.

## 2023-01-25 NOTE — NUR
HR DROPPED TO 37, CHECKED ON PT, PT WAS ASLEEP, EASILY AROUSABLE BY VERBAL STIMULI. DENIES 
PAIN. HR WENT TO 46.

## 2023-01-25 NOTE — NUR
PT LEFT AMA, SAYS THAT HER DR MADE AN APPOINTMENT FOR HER HEART STRESS TEST. SHE DOES NOT  
WANT TO WAIT HERE FOR CARDIO CONSULT.[id number]

## 2023-01-25 NOTE — NUR
PATIENT HAS BEEN SCREENED AND CATEGORIZED AS HIGH NUTRITION RISK. PATIENT WILL BE SEEN 
WITHIN 1-2 DAYS OF ADMISSION.



1/23/231/25/23



REVIEWED BY TONJA ARMENTA RD

## 2023-01-27 ENCOUNTER — HOSPITAL ENCOUNTER (EMERGENCY)
Dept: HOSPITAL 26 - MED | Age: 88
LOS: 1 days | Discharge: HOME | End: 2023-01-28
Payer: COMMERCIAL

## 2023-01-27 VITALS — DIASTOLIC BLOOD PRESSURE: 76 MMHG | SYSTOLIC BLOOD PRESSURE: 142 MMHG

## 2023-01-27 VITALS — BODY MASS INDEX: 24.75 KG/M2 | HEIGHT: 64 IN | WEIGHT: 145 LBS
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
## 2023-01-27 DIAGNOSIS — S83.92XA: Primary | ICD-10-CM

## 2023-01-27 DIAGNOSIS — Z88.5: ICD-10-CM
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
## 2023-01-27 DIAGNOSIS — Z79.899: ICD-10-CM

## 2023-01-27 DIAGNOSIS — I10: ICD-10-CM

## 2023-01-27 DIAGNOSIS — I48.91: ICD-10-CM
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
## 2023-01-27 PROCEDURE — 73562 X-RAY EXAM OF KNEE 3: CPT

## 2023-01-27 PROCEDURE — 99283 EMERGENCY DEPT VISIT LOW MDM: CPT

## 2023-01-28 VITALS — SYSTOLIC BLOOD PRESSURE: 114 MMHG | DIASTOLIC BLOOD PRESSURE: 76 MMHG

## 2023-01-28 NOTE — NUR
-------------------------------------------------------------------------------

            *** Note undone in [facility] - 01/28/23 at 0111 by [email or web address] ***            

-------------------------------------------------------------------------------

RIGHT KNEE ACE WRAP X2.

## 2023-01-28 NOTE — NUR
Patient discharged with v/s stable. Written and verbal after care instructions 
given and explained. New orders for tylenol. 

Patient verbalized understanding. Ambulatory and using cane. Picked up by son. 
All questions addressed prior to discharge. Advised to follow up with PMD.
